# Patient Record
Sex: FEMALE | Race: WHITE | HISPANIC OR LATINO | Employment: UNEMPLOYED | URBAN - METROPOLITAN AREA
[De-identification: names, ages, dates, MRNs, and addresses within clinical notes are randomized per-mention and may not be internally consistent; named-entity substitution may affect disease eponyms.]

---

## 2018-06-05 ENCOUNTER — HOSPITAL ENCOUNTER (EMERGENCY)
Facility: HOSPITAL | Age: 28
Discharge: HOME | End: 2018-06-05
Attending: EMERGENCY MEDICINE | Admitting: EMERGENCY MEDICINE
Payer: MEDICAID

## 2018-06-05 VITALS
TEMPERATURE: 97.9 F | SYSTOLIC BLOOD PRESSURE: 119 MMHG | HEART RATE: 68 BPM | RESPIRATION RATE: 16 BRPM | OXYGEN SATURATION: 99 % | WEIGHT: 197.4 LBS | DIASTOLIC BLOOD PRESSURE: 72 MMHG

## 2018-06-05 DIAGNOSIS — N91.2 AMENORRHEA: Primary | ICD-10-CM

## 2018-06-05 PROCEDURE — 99283 EMERGENCY DEPT VISIT LOW MDM: CPT

## 2018-06-05 NOTE — DISCHARGE INSTRUCTIONS
Amenorrhea   Pedro CYR & Keaton COLLINS: Amenorrhea: an approach to diagnosis and management  Am Fam Physician 2013; 27(80):931-716  Kimber VL, Kallie CLEMENTSJ, & Yvonne HD: Amenorrhea  In: Baldomero Sermons, ed  Berek & Ortiz's Gynecology, 15th ed  [facility],[address] [place] [address], 2012  Taylor Good RK & Manoj DB: Amenorrhea  In: Amparo Cartagena MW, ed  The 5-Minute Pediatric Consult, 6th ed  [facility],[address] [place] [address], 2012  Deligeoroglou E, Athanasopoulos N, Tsimaris P, et al: Evaluation and management of adolescent amenorrhea  [address] [place] [address] Drive Sci 2010; 1205:23-32  Betina Trevizo CM: Clinical practice  Functional hypothalamic amenorrhea  N Engl J Med 2010; 363(4):365-371  Courtney A, Kirk CM, & Arnoldo DELANEY: Menstrual Disorders: Amenorrhea and Polycystic Ovary Syndrome  In: Arnoldo DELANEY, ed  Handbook of Adolescent Health Care: A Practical Guide, 1st ed  [facility],[address] [place] [address], 2009 © 2017 [facility] Information is for End User's use only and may not be sold, redistributed or otherwise used for commercial purposes  All illustrations and images included in CareNotes® are the copyrighted property of A D A M , Inc  or [employer]  The above information is an  only  It is not intended as medical advice for individual conditions or treatments  Talk to your doctor, nurse or pharmacist before following any medical regimen to see if it is safe and effective for you  Amenorrhea   AMBULATORY CARE:   Amenorrhea  is the absence of menstruation (your monthly period)  Primary amenorrhea means your period did not start by age 12  This is usually because of a lack of reproductive organs, such as a uterus  Breasts and other signs of puberty that usually start to develop by age 15 do not develop  Secondary amenorrhea means you stopped having regular periods for at least 3 months or irregular periods for 6 months   Amenorrhea may be a sign of a serious medical problem that needs to be treated  Common symptoms include the following:   · Hair growth on your face or over your breastbone, or bald patches    · Acne    · Pelvic pain    · Headaches    · Vision changes    · Bruises or patches of dark skin  Contact your healthcare provider for any of the following:   · You notice changes in your menstrual cycle, such as periods that start and stop a few times  · Your female child is 15, has not started menstruating, and is not developing signs of puberty  · Your female child is 10 and has developed signs of puberty, but she has not started menstruating  · You have questions or concerns about your condition or care  Treatment for amenorrhea  may include birth control pills to restart and regulate your periods  You may need medicines to treat medical conditions such as a thyroid or pituitary disorder, or PCOS  Surgery may fix a problem that is preventing blood from flowing through your vagina, or to remove a tumor  Prevent or manage amenorrhea:   · Maintain a healthy weight  Low body weight, overweight, or obesity can all affect your period  Your healthcare provider can help you create a plan to reach and maintain a healthy weight  · Eat healthy foods  Healthy foods include fruits, vegetables, whole-grain breads, low-fat dairy products, beans, lean meats, and fish  You may need to have more calcium and vitamin D if your amenorrhea is caused by low estrogen levels  Low estrogen can affect bone strength  Calcium and vitamin D work together to help build bone  Your healthcare provider or a dietitian can help you create a meal plan that has the right number of calories and nutrients for you  · Exercise as directed  Exercise can help you build or maintain bone  Exercise can also help you lose weight if you are overweight  Ask your healthcare provider how much to exercise and which exercises are best for you   Do not exercise more than your healthcare provider recommends  Too much exercise can cause your period to stop  · Keep a record of your monthly periods  Record the dates your period started and stopped  Also record any pain or other problems during your period  · Manage stress  Try new ways to relax, such as deep breathing  Ask your healthcare provider for more information on stress management  Follow up with your healthcare provider as directed:  Write down your questions so you remember to ask them during your visits  © 2017 [address] [employer] Information is for End User's use only and may not be sold, redistributed or otherwise used for commercial purposes  All illustrations and images included in CareNotes® are the copyrighted property of A D A M , Inc  or Keegan Roman  The above information is an  only  It is not intended as medical advice for individual conditions or treatments  Talk to your doctor, nurse or pharmacist before following any medical regimen to see if it is safe and effective for you

## 2018-06-06 ENCOUNTER — TELEPHONE (OUTPATIENT)
Dept: OBGYN CLINIC | Facility: CLINIC | Age: 28
End: 2018-06-06

## 2018-06-06 NOTE — TELEPHONE ENCOUNTER
----- Message from Nila Chong MD sent at 6/6/2018  8:31 AM EDT -----      Please call to offer gyn care    ER visit for amenorrhea- negative pregnancy test

## 2018-11-01 ENCOUNTER — OUTPATIENT (OUTPATIENT)
Dept: OUTPATIENT SERVICES | Facility: HOSPITAL | Age: 28
LOS: 1 days | End: 2018-11-01
Payer: MEDICAID

## 2018-11-01 PROCEDURE — G9001: CPT

## 2018-11-27 DIAGNOSIS — Z71.89 OTHER SPECIFIED COUNSELING: ICD-10-CM
